# Patient Record
Sex: MALE | Race: WHITE | NOT HISPANIC OR LATINO | Employment: FULL TIME | URBAN - NONMETROPOLITAN AREA
[De-identification: names, ages, dates, MRNs, and addresses within clinical notes are randomized per-mention and may not be internally consistent; named-entity substitution may affect disease eponyms.]

---

## 2022-05-06 ENCOUNTER — LAB (OUTPATIENT)
Dept: LAB | Facility: HOSPITAL | Age: 77
End: 2022-05-06

## 2022-05-06 ENCOUNTER — OFFICE VISIT (OUTPATIENT)
Dept: SURGERY | Facility: CLINIC | Age: 77
End: 2022-05-06

## 2022-05-06 ENCOUNTER — PRE-ADMISSION TESTING (OUTPATIENT)
Dept: PREADMISSION TESTING | Facility: HOSPITAL | Age: 77
End: 2022-05-06

## 2022-05-06 VITALS
HEART RATE: 70 BPM | DIASTOLIC BLOOD PRESSURE: 88 MMHG | SYSTOLIC BLOOD PRESSURE: 156 MMHG | HEIGHT: 72 IN | BODY MASS INDEX: 29.53 KG/M2 | WEIGHT: 218 LBS

## 2022-05-06 DIAGNOSIS — Z01.818 PRE-OP TESTING: ICD-10-CM

## 2022-05-06 DIAGNOSIS — L72.3 SEBACEOUS CYST: ICD-10-CM

## 2022-05-06 DIAGNOSIS — L72.3 SEBACEOUS CYST: Primary | ICD-10-CM

## 2022-05-06 DIAGNOSIS — Z01.818 PRE-OP TESTING: Primary | ICD-10-CM

## 2022-05-06 PROBLEM — D17.1 LIPOMA OF BACK: Status: ACTIVE | Noted: 2022-05-06

## 2022-05-06 LAB
ANION GAP SERPL CALCULATED.3IONS-SCNC: 6.1 MMOL/L (ref 5–15)
BUN SERPL-MCNC: 18 MG/DL (ref 8–23)
BUN/CREAT SERPL: 18.2 (ref 7–25)
CALCIUM SPEC-SCNC: 8.9 MG/DL (ref 8.6–10.5)
CHLORIDE SERPL-SCNC: 108 MMOL/L (ref 98–107)
CO2 SERPL-SCNC: 26.9 MMOL/L (ref 22–29)
CREAT SERPL-MCNC: 0.99 MG/DL (ref 0.76–1.27)
DEPRECATED RDW RBC AUTO: 45.3 FL (ref 37–54)
EGFRCR SERPLBLD CKD-EPI 2021: 79.9 ML/MIN/1.73
ERYTHROCYTE [DISTWIDTH] IN BLOOD BY AUTOMATED COUNT: 13.2 % (ref 12.3–15.4)
GLUCOSE SERPL-MCNC: 143 MG/DL (ref 65–99)
HCT VFR BLD AUTO: 44.4 % (ref 37.5–51)
HGB BLD-MCNC: 14.7 G/DL (ref 13–17.7)
MCH RBC QN AUTO: 30.5 PG (ref 26.6–33)
MCHC RBC AUTO-ENTMCNC: 33.1 G/DL (ref 31.5–35.7)
MCV RBC AUTO: 92.1 FL (ref 79–97)
PLATELET # BLD AUTO: 192 10*3/MM3 (ref 140–450)
PMV BLD AUTO: 10.5 FL (ref 6–12)
POTASSIUM SERPL-SCNC: 4.6 MMOL/L (ref 3.5–5.2)
RBC # BLD AUTO: 4.82 10*6/MM3 (ref 4.14–5.8)
SARS-COV-2 RNA PNL SPEC NAA+PROBE: NOT DETECTED
SODIUM SERPL-SCNC: 141 MMOL/L (ref 136–145)
WBC NRBC COR # BLD: 5.75 10*3/MM3 (ref 3.4–10.8)

## 2022-05-06 PROCEDURE — U0004 COV-19 TEST NON-CDC HGH THRU: HCPCS | Performed by: SURGERY

## 2022-05-06 PROCEDURE — 85027 COMPLETE CBC AUTOMATED: CPT

## 2022-05-06 PROCEDURE — 99203 OFFICE O/P NEW LOW 30 MIN: CPT | Performed by: SURGERY

## 2022-05-06 PROCEDURE — 36415 COLL VENOUS BLD VENIPUNCTURE: CPT

## 2022-05-06 PROCEDURE — 80048 BASIC METABOLIC PNL TOTAL CA: CPT

## 2022-05-06 PROCEDURE — 76604 US EXAM CHEST: CPT | Performed by: SURGERY

## 2022-05-06 PROCEDURE — U0005 INFEC AGEN DETEC AMPLI PROBE: HCPCS | Performed by: SURGERY

## 2022-05-06 RX ORDER — DOXAZOSIN 8 MG/1
8 TABLET ORAL NIGHTLY
COMMUNITY
Start: 2022-03-25

## 2022-05-06 RX ORDER — ATENOLOL 50 MG/1
50 TABLET ORAL EVERY MORNING
COMMUNITY
Start: 2022-04-01

## 2022-05-06 RX ORDER — FOSINOPRIL SODIUM 20 MG/1
20 TABLET ORAL EVERY EVENING
COMMUNITY
Start: 2022-04-01

## 2022-05-06 RX ORDER — AMLODIPINE BESYLATE 5 MG/1
5 TABLET ORAL EVERY MORNING
COMMUNITY
Start: 2022-04-01

## 2022-05-06 RX ORDER — CEFAZOLIN SODIUM 2 G/50ML
2 SOLUTION INTRAVENOUS ONCE
Status: CANCELLED | OUTPATIENT
Start: 2022-05-10 | End: 2022-05-06

## 2022-05-06 RX ORDER — FINASTERIDE 5 MG/1
5 TABLET, FILM COATED ORAL EVERY EVENING
COMMUNITY
Start: 2022-03-25

## 2022-05-06 RX ORDER — DOXYCYCLINE HYCLATE 100 MG/1
100 CAPSULE ORAL 2 TIMES DAILY
Qty: 14 CAPSULE | Refills: 0 | Status: SHIPPED | OUTPATIENT
Start: 2022-05-06 | End: 2022-05-13

## 2022-05-06 RX ORDER — ROSUVASTATIN CALCIUM 10 MG/1
10 TABLET, COATED ORAL NIGHTLY
COMMUNITY
Start: 2022-04-01

## 2022-05-06 NOTE — H&P (VIEW-ONLY)
"Subjective   Abdulaziz Pulido is a 76 y.o. male here today for place on back.    History of Present Illness  Mr. Pulido was seen in the office today for evaluation of a soft tissue mass of the right upper back.  Patient states it has just been present for less than 6 months and has grown considerably in size.  The patient had an ultrasound of the soft tissue performed in [place] on 11/19/2021 which demonstrated a heterogeneous 4 cm mass that was felt to represent a benign lipoma.  Patient now presents for discussion about excision.  Patient denies any symptoms associated with this other than the rapid increase in size.  No Known Allergies  Current Outpatient Medications   Medication Sig Dispense Refill   • amLODIPine (NORVASC) 5 MG tablet      • atenolol (TENORMIN) 50 MG tablet      • doxazosin (CARDURA) 8 MG tablet      • doxycycline (VIBRAMYCIN) 100 MG capsule Take 1 capsule by mouth 2 (Two) Times a Day for 7 days. 14 capsule 0   • finasteride (PROSCAR) 5 MG tablet      • fosinopril (MONOPRIL) 20 MG tablet      • rosuvastatin (CRESTOR) 10 MG tablet        No current facility-administered medications for this visit.     Past Medical History:   Diagnosis Date   • Hypertension      Past Surgical History:   Procedure Laterality Date   • APPENDECTOMY     • COLONOSCOPY         Pertinent Review of Systems:  Respiratory: no shortness of breath  Cardiovascular: no chest pain  Other pertinent:      Objective   /88 (BP Location: Left arm)   Pulse 70   Ht 182.9 cm (72\")   Wt 98.9 kg (218 lb)   BMI 29.57 kg/m²   Physical Exam  General:  This is a WD WN male in no acute distress  Lungs:  Respiratory effort normal. Auscultation: Clear, without wheezes, rhonchi, rales  Heart:  Regular rate and rhythm, without murmur, gallop, rub.  No pedal edema  Skin: There is a large soft tissue mass right upper back measuring at least 5 cm.  By palpation this is somewhat firmer than 1 would expect with a lipoma.  There is some " erythema of the overlying skin which wife reports is new.    Procedures   Ultrasound soft tissue back    Indication: Palpable abnormality in with clinical findings felt to be inconsistent with lipoma  Description: With use of the 12.5 MHz transducer the area of clinical concern was scanned in multiple planes.  Findings: Corresponding to the palpable abnormality is a large heterogeneous mass.  In greatest dimension.  Exceeds the measurements of the ultrasound machine.  The deeper parts of the mass are more anechoic well the more superior parts are hyperechoic.  These findings are inconsistent with atypical lipoma  Impression: Ultrasound findings most consistent with sebaceous cyst rather than lipoma  Plan: Follow-up as clinically indicated    Results/Data:  Imaging: Ultrasound report from 11/19/2021 was reviewed.  No actual images were not available for review.    Assessment/Plan   Soft tissue mass left upper back.  Given ultrasound findings and overlying erythema I am concerned that this is a sebaceous cyst with developing cellulitis.  As such, strongly recommend excision rather than observation.    Plan: Proceed with surgical excision.  Patient to start antibiotics         Discussion/Summary: Patient was reluctant to have procedure done as he has never had a COVID test and did not wish to have one done prior to the procedure.    Time spent:     BMI is >= 25 and < 30. (Overweight) The following options were offered after discussion: nutrition counseling/recommendations       No future appointments.      Please note that portions of this note were completed with a voice recognition program.    This document has been electronically signed by Kristin AMOR MD on May 6, 2022 12:52 EDT

## 2022-05-06 NOTE — H&P
"Subjective   Abdulaziz Pulido is a 76 y.o. male here today for place on back.    History of Present Illness  Mr. Pulido was seen in the office today for evaluation of a soft tissue mass of the right upper back.  Patient states it has just been present for less than 6 months and has grown considerably in size.  The patient had an ultrasound of the soft tissue performed in [place] on 11/19/2021 which demonstrated a heterogeneous 4 cm mass that was felt to represent a benign lipoma.  Patient now presents for discussion about excision.  Patient denies any symptoms associated with this other than the rapid increase in size.  No Known Allergies  Current Outpatient Medications   Medication Sig Dispense Refill   • amLODIPine (NORVASC) 5 MG tablet      • atenolol (TENORMIN) 50 MG tablet      • doxazosin (CARDURA) 8 MG tablet      • doxycycline (VIBRAMYCIN) 100 MG capsule Take 1 capsule by mouth 2 (Two) Times a Day for 7 days. 14 capsule 0   • finasteride (PROSCAR) 5 MG tablet      • fosinopril (MONOPRIL) 20 MG tablet      • rosuvastatin (CRESTOR) 10 MG tablet        No current facility-administered medications for this visit.     Past Medical History:   Diagnosis Date   • Hypertension      Past Surgical History:   Procedure Laterality Date   • APPENDECTOMY     • COLONOSCOPY         Pertinent Review of Systems:  Respiratory: no shortness of breath  Cardiovascular: no chest pain  Other pertinent:      Objective   /88 (BP Location: Left arm)   Pulse 70   Ht 182.9 cm (72\")   Wt 98.9 kg (218 lb)   BMI 29.57 kg/m²   Physical Exam  General:  This is a WD WN male in no acute distress  Lungs:  Respiratory effort normal. Auscultation: Clear, without wheezes, rhonchi, rales  Heart:  Regular rate and rhythm, without murmur, gallop, rub.  No pedal edema  Skin: There is a large soft tissue mass right upper back measuring at least 5 cm.  By palpation this is somewhat firmer than 1 would expect with a lipoma.  There is some " erythema of the overlying skin which wife reports is new.    Procedures   Ultrasound soft tissue back    Indication: Palpable abnormality in with clinical findings felt to be inconsistent with lipoma  Description: With use of the 12.5 MHz transducer the area of clinical concern was scanned in multiple planes.  Findings: Corresponding to the palpable abnormality is a large heterogeneous mass.  In greatest dimension.  Exceeds the measurements of the ultrasound machine.  The deeper parts of the mass are more anechoic well the more superior parts are hyperechoic.  These findings are inconsistent with atypical lipoma  Impression: Ultrasound findings most consistent with sebaceous cyst rather than lipoma  Plan: Follow-up as clinically indicated    Results/Data:  Imaging: Ultrasound report from 11/19/2021 was reviewed.  No actual images were not available for review.    Assessment/Plan   Soft tissue mass left upper back.  Given ultrasound findings and overlying erythema I am concerned that this is a sebaceous cyst with developing cellulitis.  As such, strongly recommend excision rather than observation.    Plan: Proceed with surgical excision.  Patient to start antibiotics         Discussion/Summary: Patient was reluctant to have procedure done as he has never had a COVID test and did not wish to have one done prior to the procedure.    Time spent:     BMI is >= 25 and < 30. (Overweight) The following options were offered after discussion: nutrition counseling/recommendations       No future appointments.      Please note that portions of this note were completed with a voice recognition program.    This document has been electronically signed by Kristin AMOR MD on May 6, 2022 12:52 EDT

## 2022-05-06 NOTE — PROGRESS NOTES
"Subjective   Abdulaziz Pulido is a 76 y.o. male here today for place on back.    History of Present Illness  Mr. Pulido was seen in the office today for evaluation of a soft tissue mass of the right upper back.  Patient states it has just been present for less than 6 months and has grown considerably in size.  The patient had an ultrasound of the soft tissue performed in [place] on 11/19/2021 which demonstrated a heterogeneous 4 cm mass that was felt to represent a benign lipoma.  Patient now presents for discussion about excision.  Patient denies any symptoms associated with this other than the rapid increase in size.  No Known Allergies  Current Outpatient Medications   Medication Sig Dispense Refill   • amLODIPine (NORVASC) 5 MG tablet      • atenolol (TENORMIN) 50 MG tablet      • doxazosin (CARDURA) 8 MG tablet      • doxycycline (VIBRAMYCIN) 100 MG capsule Take 1 capsule by mouth 2 (Two) Times a Day for 7 days. 14 capsule 0   • finasteride (PROSCAR) 5 MG tablet      • fosinopril (MONOPRIL) 20 MG tablet      • rosuvastatin (CRESTOR) 10 MG tablet        No current facility-administered medications for this visit.     Past Medical History:   Diagnosis Date   • Hypertension      Past Surgical History:   Procedure Laterality Date   • APPENDECTOMY     • COLONOSCOPY         Pertinent Review of Systems:  Respiratory: no shortness of breath  Cardiovascular: no chest pain  Other pertinent:      Objective   /88 (BP Location: Left arm)   Pulse 70   Ht 182.9 cm (72\")   Wt 98.9 kg (218 lb)   BMI 29.57 kg/m²   Physical Exam  General:  This is a WD WN male in no acute distress  Lungs:  Respiratory effort normal. Auscultation: Clear, without wheezes, rhonchi, rales  Heart:  Regular rate and rhythm, without murmur, gallop, rub.  No pedal edema  Skin: There is a large soft tissue mass right upper back measuring at least 5 cm.  By palpation this is somewhat firmer than 1 would expect with a lipoma.  There is some " erythema of the overlying skin which wife reports is new.    Procedures   Ultrasound soft tissue back    Indication: Palpable abnormality in with clinical findings felt to be inconsistent with lipoma  Description: With use of the 12.5 MHz transducer the area of clinical concern was scanned in multiple planes.  Findings: Corresponding to the palpable abnormality is a large heterogeneous mass.  In greatest dimension.  Exceeds the measurements of the ultrasound machine.  The deeper parts of the mass are more anechoic well the more superior parts are hyperechoic.  These findings are inconsistent with atypical lipoma  Impression: Ultrasound findings most consistent with sebaceous cyst rather than lipoma  Plan: Follow-up as clinically indicated    Results/Data:  Imaging: Ultrasound report from 11/19/2021 was reviewed.  No actual images were not available for review.    Assessment/Plan   Soft tissue mass left upper back.  Given ultrasound findings and overlying erythema I am concerned that this is a sebaceous cyst with developing cellulitis.  As such, strongly recommend excision rather than observation.    Plan: Proceed with surgical excision.  Patient to start antibiotics         Discussion/Summary: Patient was reluctant to have procedure done as he has never had a COVID test and did not wish to have one done prior to the procedure.    Time spent:     BMI is >= 25 and < 30. (Overweight) The following options were offered after discussion: nutrition counseling/recommendations       No future appointments.      Please note that portions of this note were completed with a voice recognition program.

## 2022-05-09 ENCOUNTER — TELEPHONE (OUTPATIENT)
Dept: SURGERY | Facility: CLINIC | Age: 77
End: 2022-05-09

## 2022-05-10 ENCOUNTER — ANESTHESIA EVENT (OUTPATIENT)
Dept: PERIOP | Facility: HOSPITAL | Age: 77
End: 2022-05-10

## 2022-05-10 ENCOUNTER — ANESTHESIA (OUTPATIENT)
Dept: PERIOP | Facility: HOSPITAL | Age: 77
End: 2022-05-10

## 2022-05-10 ENCOUNTER — HOSPITAL ENCOUNTER (OUTPATIENT)
Facility: HOSPITAL | Age: 77
Setting detail: HOSPITAL OUTPATIENT SURGERY
Discharge: HOME | End: 2022-05-10
Attending: SURGERY | Admitting: SURGERY

## 2022-05-10 VITALS
HEART RATE: 62 BPM | SYSTOLIC BLOOD PRESSURE: 112 MMHG | WEIGHT: 213 LBS | DIASTOLIC BLOOD PRESSURE: 72 MMHG | RESPIRATION RATE: 16 BRPM | OXYGEN SATURATION: 96 % | BODY MASS INDEX: 28.85 KG/M2 | HEIGHT: 72 IN | TEMPERATURE: 97.8 F

## 2022-05-10 DIAGNOSIS — D17.1 LIPOMA OF BACK: Primary | ICD-10-CM

## 2022-05-10 DIAGNOSIS — L72.3 SEBACEOUS CYST: ICD-10-CM

## 2022-05-10 PROCEDURE — 88304 TISSUE EXAM BY PATHOLOGIST: CPT

## 2022-05-10 PROCEDURE — 25010000002 PHENYLEPHRINE 10 MG/ML SOLUTION: Performed by: NURSE ANESTHETIST, CERTIFIED REGISTERED

## 2022-05-10 PROCEDURE — 25010000002 PROPOFOL 10 MG/ML EMULSION: Performed by: NURSE ANESTHETIST, CERTIFIED REGISTERED

## 2022-05-10 PROCEDURE — 0 CEFAZOLIN SODIUM-DEXTROSE 2-3 GM-%(50ML) RECONSTITUTED SOLUTION: Performed by: SURGERY

## 2022-05-10 PROCEDURE — 88342 IMHCHEM/IMCYTCHM 1ST ANTB: CPT

## 2022-05-10 PROCEDURE — 21932 EXC BACK TUM DEEP < 5 CM: CPT | Performed by: SURGERY

## 2022-05-10 PROCEDURE — 25010000002 ONDANSETRON PER 1 MG: Performed by: NURSE ANESTHETIST, CERTIFIED REGISTERED

## 2022-05-10 PROCEDURE — 25010000002 FENTANYL CITRATE (PF) 50 MCG/ML SOLUTION: Performed by: NURSE ANESTHETIST, CERTIFIED REGISTERED

## 2022-05-10 RX ORDER — IPRATROPIUM BROMIDE AND ALBUTEROL SULFATE 2.5; .5 MG/3ML; MG/3ML
3 SOLUTION RESPIRATORY (INHALATION) ONCE AS NEEDED
Status: DISCONTINUED | OUTPATIENT
Start: 2022-05-10 | End: 2022-05-10 | Stop reason: HOSPADM

## 2022-05-10 RX ORDER — DROPERIDOL 2.5 MG/ML
0.62 INJECTION, SOLUTION INTRAMUSCULAR; INTRAVENOUS ONCE AS NEEDED
Status: DISCONTINUED | OUTPATIENT
Start: 2022-05-10 | End: 2022-05-10 | Stop reason: HOSPADM

## 2022-05-10 RX ORDER — ONDANSETRON 2 MG/ML
4 INJECTION INTRAMUSCULAR; INTRAVENOUS AS NEEDED
Status: DISCONTINUED | OUTPATIENT
Start: 2022-05-10 | End: 2022-05-10 | Stop reason: HOSPADM

## 2022-05-10 RX ORDER — MIDAZOLAM HYDROCHLORIDE 1 MG/ML
0.5 INJECTION INTRAMUSCULAR; INTRAVENOUS
Status: DISCONTINUED | OUTPATIENT
Start: 2022-05-10 | End: 2022-05-10 | Stop reason: HOSPADM

## 2022-05-10 RX ORDER — BUPIVACAINE HYDROCHLORIDE AND EPINEPHRINE 5; 5 MG/ML; UG/ML
INJECTION, SOLUTION EPIDURAL; INTRACAUDAL; PERINEURAL AS NEEDED
Status: DISCONTINUED | OUTPATIENT
Start: 2022-05-10 | End: 2022-05-10 | Stop reason: HOSPADM

## 2022-05-10 RX ORDER — MEPERIDINE HYDROCHLORIDE 25 MG/ML
12.5 INJECTION INTRAMUSCULAR; INTRAVENOUS; SUBCUTANEOUS
Status: DISCONTINUED | OUTPATIENT
Start: 2022-05-10 | End: 2022-05-10 | Stop reason: HOSPADM

## 2022-05-10 RX ORDER — PROPOFOL 10 MG/ML
VIAL (ML) INTRAVENOUS AS NEEDED
Status: DISCONTINUED | OUTPATIENT
Start: 2022-05-10 | End: 2022-05-10 | Stop reason: SURG

## 2022-05-10 RX ORDER — HYDROCODONE BITARTRATE AND ACETAMINOPHEN 7.5; 325 MG/1; MG/1
1 TABLET ORAL 4 TIMES DAILY PRN
Qty: 8 TABLET | Refills: 0 | Status: SHIPPED | OUTPATIENT
Start: 2022-05-10

## 2022-05-10 RX ORDER — ONDANSETRON 2 MG/ML
INJECTION INTRAMUSCULAR; INTRAVENOUS AS NEEDED
Status: DISCONTINUED | OUTPATIENT
Start: 2022-05-10 | End: 2022-05-10 | Stop reason: SURG

## 2022-05-10 RX ORDER — FENTANYL CITRATE 50 UG/ML
INJECTION, SOLUTION INTRAMUSCULAR; INTRAVENOUS AS NEEDED
Status: DISCONTINUED | OUTPATIENT
Start: 2022-05-10 | End: 2022-05-10 | Stop reason: SURG

## 2022-05-10 RX ORDER — SODIUM CHLORIDE, SODIUM LACTATE, POTASSIUM CHLORIDE, CALCIUM CHLORIDE 600; 310; 30; 20 MG/100ML; MG/100ML; MG/100ML; MG/100ML
125 INJECTION, SOLUTION INTRAVENOUS ONCE
Status: COMPLETED | OUTPATIENT
Start: 2022-05-10 | End: 2022-05-10

## 2022-05-10 RX ORDER — OXYCODONE HYDROCHLORIDE AND ACETAMINOPHEN 5; 325 MG/1; MG/1
1 TABLET ORAL ONCE AS NEEDED
Status: DISCONTINUED | OUTPATIENT
Start: 2022-05-10 | End: 2022-05-10 | Stop reason: HOSPADM

## 2022-05-10 RX ORDER — FENTANYL CITRATE 50 UG/ML
50 INJECTION, SOLUTION INTRAMUSCULAR; INTRAVENOUS
Status: DISCONTINUED | OUTPATIENT
Start: 2022-05-10 | End: 2022-05-10 | Stop reason: HOSPADM

## 2022-05-10 RX ORDER — ASPIRIN 81 MG/1
81 TABLET, CHEWABLE ORAL DAILY
COMMUNITY

## 2022-05-10 RX ORDER — FAMOTIDINE 10 MG/ML
INJECTION, SOLUTION INTRAVENOUS AS NEEDED
Status: DISCONTINUED | OUTPATIENT
Start: 2022-05-10 | End: 2022-05-10 | Stop reason: SURG

## 2022-05-10 RX ORDER — EPHEDRINE SULFATE 5 MG/ML
INJECTION INTRAVENOUS AS NEEDED
Status: DISCONTINUED | OUTPATIENT
Start: 2022-05-10 | End: 2022-05-10 | Stop reason: SURG

## 2022-05-10 RX ORDER — SODIUM CHLORIDE 0.9 % (FLUSH) 0.9 %
10 SYRINGE (ML) INJECTION EVERY 12 HOURS SCHEDULED
Status: DISCONTINUED | OUTPATIENT
Start: 2022-05-10 | End: 2022-05-10 | Stop reason: HOSPADM

## 2022-05-10 RX ORDER — MAGNESIUM HYDROXIDE 1200 MG/15ML
LIQUID ORAL AS NEEDED
Status: DISCONTINUED | OUTPATIENT
Start: 2022-05-10 | End: 2022-05-10 | Stop reason: HOSPADM

## 2022-05-10 RX ORDER — LIDOCAINE HYDROCHLORIDE 20 MG/ML
INJECTION, SOLUTION INFILTRATION; PERINEURAL AS NEEDED
Status: DISCONTINUED | OUTPATIENT
Start: 2022-05-10 | End: 2022-05-10 | Stop reason: SURG

## 2022-05-10 RX ORDER — CEFAZOLIN SODIUM 2 G/50ML
2 SOLUTION INTRAVENOUS ONCE
Status: COMPLETED | OUTPATIENT
Start: 2022-05-10 | End: 2022-05-10

## 2022-05-10 RX ORDER — PHENYLEPHRINE HYDROCHLORIDE 10 MG/ML
INJECTION INTRAVENOUS AS NEEDED
Status: DISCONTINUED | OUTPATIENT
Start: 2022-05-10 | End: 2022-05-10 | Stop reason: SURG

## 2022-05-10 RX ORDER — SODIUM CHLORIDE 0.9 % (FLUSH) 0.9 %
10 SYRINGE (ML) INJECTION AS NEEDED
Status: DISCONTINUED | OUTPATIENT
Start: 2022-05-10 | End: 2022-05-10 | Stop reason: HOSPADM

## 2022-05-10 RX ORDER — SODIUM CHLORIDE, SODIUM LACTATE, POTASSIUM CHLORIDE, CALCIUM CHLORIDE 600; 310; 30; 20 MG/100ML; MG/100ML; MG/100ML; MG/100ML
100 INJECTION, SOLUTION INTRAVENOUS ONCE AS NEEDED
Status: DISCONTINUED | OUTPATIENT
Start: 2022-05-10 | End: 2022-05-10 | Stop reason: HOSPADM

## 2022-05-10 RX ADMIN — FENTANYL CITRATE 100 MCG: 50 INJECTION INTRAMUSCULAR; INTRAVENOUS at 07:40

## 2022-05-10 RX ADMIN — ONDANSETRON 4 MG: 2 INJECTION INTRAMUSCULAR; INTRAVENOUS at 07:40

## 2022-05-10 RX ADMIN — SODIUM CHLORIDE, POTASSIUM CHLORIDE, SODIUM LACTATE AND CALCIUM CHLORIDE: 600; 310; 30; 20 INJECTION, SOLUTION INTRAVENOUS at 07:34

## 2022-05-10 RX ADMIN — LIDOCAINE HYDROCHLORIDE 40 MG: 20 INJECTION, SOLUTION INFILTRATION; PERINEURAL at 07:40

## 2022-05-10 RX ADMIN — EPHEDRINE SULFATE 5 MG: 5 INJECTION INTRAVENOUS at 07:57

## 2022-05-10 RX ADMIN — EPHEDRINE SULFATE 10 MG: 5 INJECTION INTRAVENOUS at 07:52

## 2022-05-10 RX ADMIN — CEFAZOLIN SODIUM 2 G: 2 SOLUTION INTRAVENOUS at 07:34

## 2022-05-10 RX ADMIN — PHENYLEPHRINE HYDROCHLORIDE 100 MCG: 10 INJECTION INTRAVENOUS at 07:58

## 2022-05-10 RX ADMIN — EPHEDRINE SULFATE 10 MG: 5 INJECTION INTRAVENOUS at 08:02

## 2022-05-10 RX ADMIN — EPHEDRINE SULFATE 5 MG: 5 INJECTION INTRAVENOUS at 07:54

## 2022-05-10 RX ADMIN — PHENYLEPHRINE HYDROCHLORIDE 100 MCG: 10 INJECTION INTRAVENOUS at 08:00

## 2022-05-10 RX ADMIN — PROPOFOL 150 MG: 10 INJECTION, EMULSION INTRAVENOUS at 07:40

## 2022-05-10 RX ADMIN — FAMOTIDINE 20 MG: 10 INJECTION INTRAVENOUS at 07:40

## 2022-05-10 NOTE — ANESTHESIA PREPROCEDURE EVALUATION
Anesthesia Evaluation     Patient summary reviewed and Nursing notes reviewed   no history of anesthetic complications:  NPO Solid Status: > 8 hours  NPO Liquid Status: > 8 hours           Airway   Mallampati: II  TM distance: >3 FB  Neck ROM: full  Dental - normal exam     Pulmonary - negative pulmonary ROS    breath sounds clear to auscultation  Cardiovascular   Exercise tolerance: good (4-7 METS)    Rhythm: regular  Rate: normal    (+) hypertension,       Neuro/Psych- negative ROS  GI/Hepatic/Renal/Endo - negative ROS     Musculoskeletal (-) negative ROS    Abdominal     Abdomen: soft.   Substance History - negative use     OB/GYN          Other - negative ROS                       Anesthesia Plan    ASA 2     general     intravenous induction     Anesthetic plan, all risks, benefits, and alternatives have been provided, discussed and informed consent has been obtained with: patient.    Plan discussed with CRNA.        CODE STATUS:

## 2022-05-10 NOTE — OP NOTE
"Excision of subcutaneous mass left upper back    Pre-op: Lipoma versus sebaceous cyst left upper back    Post-op: Subcutaneous mass left upper back    Surgeon:  Kristin Duarte M.D., F.A.C.S.    Assistant: Hector    Anesthesia:  general      Indications: 76-year-old patient presents with a rapidly enlarging subcutaneous mass of the left back.  Clinically and by ultrasound the mass was not consistent with a lipoma and the patient had developed erythema over the skin of the mass.  He was started on preop antibiotics       Procedure Details   After obtaining informed consent and receiving preoperative antibiotics and with venous compression boots in place, the patient was taken to the operating room and placed under anesthesia.  Patient was placed in the lateral position and the mass was prepped and draped in a sterile fashion.  An elliptical incision over the mass was made and carried down into the subcu.  The mass was in the deep tissue and deep to Ricardo's fascia.  It was not a sebaceous cyst.  It had the appearance of a lipoma but was much firmer.  The mass was excised which required dissection down to the deep fascia.  The area was inspected and additional piece of deep tissue was excised.  Hemostasis was obtained.  Because the mass had a somewhat atypical appearance it was inked for orientation.  Size of the mass removed was 4.5 x 3.8 cm x 3.2 cm in height.  Incision length of 4.6 cm incision was closed with deep 0 Vicryl sutures followed by 3-0 Vicryl subdermal sutures and 4-0 Monocryl.  Dressing was placed.  Local was injected for analgesia.  Patient tolerated the procedure well and was taken to the recovery room in stable condition    Findings:    Estimated Blood Loss:  Minimal    Blood Administered: none           Drains: none           Specimens:   ID Type Source Tests Collected by Time   A : \"SUBCUTANEOUS MASS\" Tissue Back, Upper TISSUE EXAM, [facility] ([facility]) Kristin Duarte MD 5/10/2022 0759 "        Grafts and Implants: No       Complications:  none           Disposition: PACU - hemodynamically stable.           Condition: stable

## 2022-05-10 NOTE — ANESTHESIA PROCEDURE NOTES
Airway  Urgency: elective    Date/Time: 5/10/2022 7:41 AM    General Information and Staff    Patient location during procedure: OR  CRNA/CAA: Brinda Caba CRNA    Indications and Patient Condition    Preoxygenated: yes      Final Airway Details  Final airway type: supraglottic airway      Successful airway: Supreme  Size 4    Number of attempts at approach: 1  Assessment: lips, teeth, and gum same as pre-op and atraumatic intubation

## 2022-05-13 LAB — REF LAB TEST METHOD: NORMAL

## 2022-05-20 ENCOUNTER — OFFICE VISIT (OUTPATIENT)
Dept: SURGERY | Facility: CLINIC | Age: 77
End: 2022-05-20

## 2022-05-20 VITALS
SYSTOLIC BLOOD PRESSURE: 146 MMHG | DIASTOLIC BLOOD PRESSURE: 88 MMHG | HEART RATE: 90 BPM | WEIGHT: 215 LBS | HEIGHT: 72 IN | BODY MASS INDEX: 29.12 KG/M2

## 2022-05-20 DIAGNOSIS — Z09 POSTOP CHECK: ICD-10-CM

## 2022-05-20 DIAGNOSIS — D17.1 LIPOMA OF BACK: Primary | ICD-10-CM

## 2022-05-20 PROCEDURE — 99024 POSTOP FOLLOW-UP VISIT: CPT | Performed by: SURGERY

## 2022-05-20 RX ORDER — CEFDINIR 300 MG/1
300 CAPSULE ORAL 2 TIMES DAILY
Qty: 14 CAPSULE | Refills: 0 | Status: SHIPPED | OUTPATIENT
Start: 2022-05-20 | End: 2022-05-27

## 2022-05-20 NOTE — PROGRESS NOTES
"Subjective   Abdulaziz Pulido is a 76 y.o. male here today for post op.    History of Present Illness  Mr. Pulido was seen in the office today for a postoperative visit following excision of a large subcutaneous mass of the back on 5/10/2022.  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 Wife reports some concern about redness around the incision.  No Known Allergies      Current Outpatient Medications   Medication Sig Dispense Refill   • amLODIPine (NORVASC) 5 MG tablet Take 5 mg by mouth Every Morning.     • aspirin 81 MG chewable tablet Chew 81 mg Daily.     • atenolol (TENORMIN) 50 MG tablet Take 50 mg by mouth Every Morning.     • doxazosin (CARDURA) 8 MG tablet Take 8 mg by mouth Every Night.     • finasteride (PROSCAR) 5 MG tablet Take 5 mg by mouth Every Evening.     • fosinopril (MONOPRIL) 20 MG tablet Take 20 mg by mouth Every Evening.     • rosuvastatin (CRESTOR) 10 MG tablet Take 10 mg by mouth Every Night.     • HYDROcodone-acetaminophen (Norco) 7.5-325 MG per tablet Take 1 tablet by mouth 4 (Four) Times a Day As Needed for Moderate Pain . 8 tablet 0     No current facility-administered medications for this visit.       Objective   /88 (BP Location: Left arm)   Pulse 90   Ht 182.9 cm (72\")   Wt 97.5 kg (215 lb)   BMI 29.16 kg/m²    Physical Exam  On examination of the upper back there is a healing transverse scar.  8 cc seroma was aspirated.  There is some mild erythema around the incision.  Is not clear if this is reactive erythema or cellulitis.  Results/Data  Pathology report was reviewed and discussed with the patient and wife    Procedures     Assessment & Plan   Status post excision of a benign spindle cell/pleomorphic lipoma    Will call in some antibiotics in case erythema worsens.  Follow-up as needed       Discussion/Summary  BMI is >= 25 and < 30. (Overweight) The following options were offered after discussion: nutrition counseling/recommendations       Future " Appointments   Date Time Provider Department Center   5/20/2022  8:40 AM Kristin Duarte MD [facility]         Please note that portions of this note were completed with a voice recognition program.

## (undated) DEVICE — PK BASIC 70

## (undated) DEVICE — APPL CHLORAPREP HI/LITE 26ML ORNG

## (undated) DEVICE — GLV SURG PREMIERPRO MIC LTX PF SZ7 BRN

## (undated) DEVICE — SUT VIC 3/0 TIES J104T

## (undated) DEVICE — ELECTRD BLD EZ CLN MOD 4IN

## (undated) DEVICE — KT INK TISS MARGINMARKER STD 6COLOR

## (undated) DEVICE — TBG PENCL TELESCP MEGADYNE SMOKE EVAC 10FT

## (undated) DEVICE — ELECTRD NDL EZ CLN MOD 2.75IN

## (undated) DEVICE — ANTIBACTERIAL VIOLET BRAIDED (POLYGLACTIN 910), SYNTHETIC ABSORBABLE SUTURE: Brand: COATED VICRYL

## (undated) DEVICE — HOLDER: Brand: DEROYAL

## (undated) DEVICE — DBD-DRAPE,LAP,CHOLE,W/TROUGHS,STERILE: Brand: MEDLINE

## (undated) DEVICE — PATIENT RETURN ELECTRODE, SINGLE-USE, CONTACT QUALITY MONITORING, ADULT, WITH 9FT CORD, FOR PATIENTS WEIGING OVER 33LBS. (15KG): Brand: MEGADYNE

## (undated) DEVICE — PATIENT RETURN ELECTRODE, SINGLE-USE, CONTACT QUALITY MONITORING, ADULT, WITH 15 FT (4.5 M) CORD. FOR PATIENTS WEIGHING OVER 33LBS. (15KG): Brand: MEGADYNE

## (undated) DEVICE — SUT VIC 4/0 RB1 27IN J214H

## (undated) DEVICE — ELECTRD NDL MEGADYNE EZCLEAN NOSE 7CM